# Patient Record
Sex: FEMALE | Race: WHITE | NOT HISPANIC OR LATINO | URBAN - NONMETROPOLITAN AREA
[De-identification: names, ages, dates, MRNs, and addresses within clinical notes are randomized per-mention and may not be internally consistent; named-entity substitution may affect disease eponyms.]

---

## 2023-05-24 ENCOUNTER — LAB OUTSIDE AN ENCOUNTER (OUTPATIENT)
Dept: URBAN - NONMETROPOLITAN AREA CLINIC 2 | Facility: CLINIC | Age: 48
End: 2023-05-24

## 2023-05-24 ENCOUNTER — OFFICE VISIT (OUTPATIENT)
Dept: URBAN - NONMETROPOLITAN AREA CLINIC 2 | Facility: CLINIC | Age: 48
End: 2023-05-24
Payer: COMMERCIAL

## 2023-05-24 ENCOUNTER — WEB ENCOUNTER (OUTPATIENT)
Dept: URBAN - NONMETROPOLITAN AREA CLINIC 2 | Facility: CLINIC | Age: 48
End: 2023-05-24

## 2023-05-24 VITALS
HEART RATE: 73 BPM | SYSTOLIC BLOOD PRESSURE: 145 MMHG | BODY MASS INDEX: 28.61 KG/M2 | HEIGHT: 66 IN | DIASTOLIC BLOOD PRESSURE: 95 MMHG | WEIGHT: 178 LBS

## 2023-05-24 DIAGNOSIS — R13.19 ESOPHAGEAL DYSPHAGIA: ICD-10-CM

## 2023-05-24 DIAGNOSIS — Z12.11 SCREENING FOR COLON CANCER: ICD-10-CM

## 2023-05-24 PROCEDURE — 99244 OFF/OP CNSLTJ NEW/EST MOD 40: CPT | Performed by: INTERNAL MEDICINE

## 2023-05-24 PROCEDURE — 99204 OFFICE O/P NEW MOD 45 MIN: CPT | Performed by: INTERNAL MEDICINE

## 2023-05-24 RX ORDER — RIZATRIPTAN 5 MG/1
TABLET, FILM COATED ORAL
Qty: 10 UNSPECIFIED | Status: ACTIVE | COMMUNITY

## 2023-05-24 RX ORDER — SODIUM PICOSULFATE, MAGNESIUM OXIDE, AND ANHYDROUS CITRIC ACID 10; 3.5; 12 MG/160ML; G/160ML; G/160ML
160 ML THE FIRST DOSE THE EVENING BEFORE AND SECOND DOSE THE MORNING OF COLONOSCOPY LIQUID ORAL ONCE A DAY
Qty: 640 | OUTPATIENT
Start: 2023-05-24 | End: 2023-05-26

## 2023-05-24 NOTE — HPI-TODAY'S VISIT:
5/24/2023 Ms. Kohler was referred to our office for evaluation screening for colon cancer by Dr. Radha Brewster . A copy of this note and recommendations will be sent to the referring provider's office. Intially she questions if we prefer to repeat her EGD.  She continues with the same symptoms of pills and soft as well as solid foods getting stuck in her distal esophagus.  She is kept close watch on her diet tried multiple eliminations including dairy and gluten and has not been able to find any trigger foods.  She has tried Levsin to no avail.  She is not having trouble with swallowing liquids alone.  There are episodes where she experiences food getting stuck daily however she can also go weeks without issue. She denies any symptoms of reflux and only gets occasional heartburn if she eats spicy food or has 2 large meal at which time she takes Pepcid.  She prefers to not take a PPI . She did not note improvement.  previous work-up with Dr. Olson with  EGD in 2016 which revealed mild esophagitis in the distal third of the esophagus with rings and furrows consistent with possible EOE.  She had mild gastritis in the antrum.  Path resulted negative for EOE, showing mild chronic gastritis and negative H. pylori.  There were no identified features of reflux . She denies any family history of colon cancer or changes in her bowel habits.  She denies any blood per rectum or unintentional weight loss.  SP

## 2023-07-06 ENCOUNTER — TELEPHONE ENCOUNTER (OUTPATIENT)
Dept: URBAN - NONMETROPOLITAN AREA CLINIC 2 | Facility: CLINIC | Age: 48
End: 2023-07-06

## 2023-07-18 ENCOUNTER — LAB OUTSIDE AN ENCOUNTER (OUTPATIENT)
Dept: URBAN - NONMETROPOLITAN AREA CLINIC 2 | Facility: CLINIC | Age: 48
End: 2023-07-18

## 2023-09-06 ENCOUNTER — OFFICE VISIT (OUTPATIENT)
Dept: URBAN - NONMETROPOLITAN AREA SURGERY CENTER 1 | Facility: SURGERY CENTER | Age: 48
End: 2023-09-06

## 2023-09-22 ENCOUNTER — CLAIMS CREATED FROM THE CLAIM WINDOW (OUTPATIENT)
Dept: URBAN - METROPOLITAN AREA CLINIC 4 | Facility: CLINIC | Age: 48
End: 2023-09-22
Payer: COMMERCIAL

## 2023-09-22 ENCOUNTER — CLAIMS CREATED FROM THE CLAIM WINDOW (OUTPATIENT)
Dept: URBAN - NONMETROPOLITAN AREA SURGERY CENTER 1 | Facility: SURGERY CENTER | Age: 48
End: 2023-09-22

## 2023-09-22 ENCOUNTER — CLAIMS CREATED FROM THE CLAIM WINDOW (OUTPATIENT)
Dept: URBAN - NONMETROPOLITAN AREA SURGERY CENTER 1 | Facility: SURGERY CENTER | Age: 48
End: 2023-09-22
Payer: COMMERCIAL

## 2023-09-22 DIAGNOSIS — K29.70 GASTRITIS, UNSPECIFIED, WITHOUT BLEEDING: ICD-10-CM

## 2023-09-22 DIAGNOSIS — K31.9 ERYTHEMA OF GASTRIC ANTRUM: ICD-10-CM

## 2023-09-22 DIAGNOSIS — R10.13 EPIGASTRIC ABDOMINAL PAIN: ICD-10-CM

## 2023-09-22 DIAGNOSIS — K31.89 OTHER DISEASES OF STOMACH AND DUODENUM: ICD-10-CM

## 2023-09-22 DIAGNOSIS — Z12.11 COLON CANCER SCREENING: ICD-10-CM

## 2023-09-22 DIAGNOSIS — K29.60 ADENOPAPILLOMATOSIS GASTRICA: ICD-10-CM

## 2023-09-22 DIAGNOSIS — K21.9 ACID REFLUX: ICD-10-CM

## 2023-09-22 DIAGNOSIS — K22.2 ACQUIRED ESOPHAGEAL RING: ICD-10-CM

## 2023-09-22 DIAGNOSIS — K63.89 APPENDICITIS EPIPLOICA: ICD-10-CM

## 2023-09-22 DIAGNOSIS — D12.2 BENIGN NEOPLASM OF ASCENDING COLON: ICD-10-CM

## 2023-09-22 DIAGNOSIS — K22.2 ESOPHAGEAL STENOSIS: ICD-10-CM

## 2023-09-22 PROCEDURE — 00813 ANES UPR LWR GI NDSC PX: CPT | Performed by: NURSE ANESTHETIST, CERTIFIED REGISTERED

## 2023-09-22 PROCEDURE — 43239 EGD BIOPSY SINGLE/MULTIPLE: CPT | Performed by: INTERNAL MEDICINE

## 2023-09-22 PROCEDURE — G8907 PT DOC NO EVENTS ON DISCHARG: HCPCS | Performed by: INTERNAL MEDICINE

## 2023-09-22 PROCEDURE — 88305 TISSUE EXAM BY PATHOLOGIST: CPT | Performed by: PATHOLOGY

## 2023-09-22 PROCEDURE — 45385 COLONOSCOPY W/LESION REMOVAL: CPT | Performed by: INTERNAL MEDICINE

## 2023-09-22 PROCEDURE — 43249 ESOPH EGD DILATION <30 MM: CPT | Performed by: INTERNAL MEDICINE

## 2023-09-22 PROCEDURE — 88312 SPECIAL STAINS GROUP 1: CPT | Performed by: PATHOLOGY

## 2023-09-22 RX ORDER — RIZATRIPTAN 5 MG/1
TABLET, FILM COATED ORAL
Qty: 10 UNSPECIFIED | Status: ACTIVE | COMMUNITY

## 2023-09-26 ENCOUNTER — TELEPHONE ENCOUNTER (OUTPATIENT)
Dept: URBAN - NONMETROPOLITAN AREA CLINIC 2 | Facility: CLINIC | Age: 48
End: 2023-09-26

## 2023-10-10 ENCOUNTER — OFFICE VISIT (OUTPATIENT)
Dept: URBAN - NONMETROPOLITAN AREA CLINIC 2 | Facility: CLINIC | Age: 48
End: 2023-10-10
Payer: COMMERCIAL

## 2023-10-10 ENCOUNTER — DASHBOARD ENCOUNTERS (OUTPATIENT)
Age: 48
End: 2023-10-10

## 2023-10-10 VITALS
WEIGHT: 180 LBS | SYSTOLIC BLOOD PRESSURE: 133 MMHG | BODY MASS INDEX: 28.93 KG/M2 | HEIGHT: 66 IN | HEART RATE: 71 BPM | DIASTOLIC BLOOD PRESSURE: 91 MMHG

## 2023-10-10 DIAGNOSIS — R13.19 ESOPHAGEAL DYSPHAGIA: ICD-10-CM

## 2023-10-10 DIAGNOSIS — Z12.11 SCREENING FOR COLON CANCER: ICD-10-CM

## 2023-10-10 PROBLEM — 305058001: Status: ACTIVE | Noted: 2023-06-06

## 2023-10-10 PROBLEM — 40890009: Status: ACTIVE | Noted: 2023-06-06

## 2023-10-10 PROCEDURE — 99213 OFFICE O/P EST LOW 20 MIN: CPT

## 2023-10-10 RX ORDER — RIZATRIPTAN 5 MG/1
TABLET, FILM COATED ORAL
Qty: 10 UNSPECIFIED | Status: ACTIVE | COMMUNITY

## 2023-10-10 NOTE — HPI-TODAY'S VISIT:
5/24/2023 Ms. Kohler was referred to our office for evaluation screening for colon cancer by Dr. Radha Brewster . A copy of this note and recommendations will be sent to the referring provider's office. Intially she questions if we prefer to repeat her EGD.  She continues with the same symptoms of pills and soft as well as solid foods getting stuck in her distal esophagus.  She is kept close watch on her diet tried multiple eliminations including dairy and gluten and has not been able to find any trigger foods.  She has tried Levsin to no avail.  She is not having trouble with swallowing liquids alone.  There are episodes where she experiences food getting stuck daily however she can also go weeks without issue. She denies any symptoms of reflux and only gets occasional heartburn if she eats spicy food or has 2 large meal at which time she takes Pepcid.  She prefers to not take a PPI . She did not note improvement.  previous work-up with Dr. Olson with  EGD in 2016 which revealed mild esophagitis in the distal third of the esophagus with rings and furrows consistent with possible EOE.  She had mild gastritis in the antrum.  Path resulted negative for EOE, showing mild chronic gastritis and negative H. pylori.  There were no identified features of reflux . She denies any family history of colon cancer or changes in her bowel habits.  She denies any blood per rectum or unintentional weight loss.  SP  10/10/23 Mrs. Kohler returns to clinic after EGD with dil, reflux inflammation on path and colonoscopy with hyperplastic polyp only. She reports improved swallowing with no more difficulty and continues famotidine daily.  Today we reviewed her timeline to repeat colonoscopy in 10 yrs, sooner if 1st line relative develops CRC. SHe will return for dilation if needed, maintain on famotidine with excellent GERD lifestyle and diet management.  PURNIMA